# Patient Record
Sex: FEMALE | URBAN - METROPOLITAN AREA
[De-identification: names, ages, dates, MRNs, and addresses within clinical notes are randomized per-mention and may not be internally consistent; named-entity substitution may affect disease eponyms.]

---

## 2022-03-23 ENCOUNTER — OFFICE VISIT (OUTPATIENT)
Dept: URBAN - METROPOLITAN AREA CLINIC 6 | Facility: CLINIC | Age: 62
End: 2022-03-23

## 2022-03-23 DIAGNOSIS — H43.313 VITREOUS MEMBRANES AND STRANDS, BILATERAL: ICD-10-CM

## 2022-03-23 DIAGNOSIS — H52.03 HYPERMETROPIA, BILATERAL: ICD-10-CM

## 2022-03-23 DIAGNOSIS — G51.4 FACIAL MYOKYMIA: ICD-10-CM

## 2022-03-23 DIAGNOSIS — H04.123 TEAR FILM INSUFFICIENCY OF BILATERAL LACRIMAL GLANDS: ICD-10-CM

## 2022-03-23 DIAGNOSIS — H25.813 COMBINED FORMS OF AGE-RELATED CATARACT, BILATERAL: Primary | ICD-10-CM

## 2022-03-23 PROCEDURE — 92004 COMPRE OPH EXAM NEW PT 1/>: CPT | Performed by: OPTOMETRIST

## 2022-03-23 ASSESSMENT — VISUAL ACUITY
OD: 20/20
OS: 20/20

## 2022-03-23 ASSESSMENT — INTRAOCULAR PRESSURE
OS: 13
OD: 16

## 2022-03-23 NOTE — IMPRESSION/PLAN
Impression: Facial myokymia: G51.4. Plan: Ocular myokymia : explained to the patient that is a benign event, commonly caused by high caffeine intake, stress, sleep deprivation and dry eyes. Start aggresive lubrication with high quality artificial tears and lifestyle modifications. Discussed use of muscle relaxants or Botox injections as an option if becomes problematic. Continue to monitor, but return if worsens or develops any other signs or symptoms.